# Patient Record
Sex: MALE | Race: WHITE | NOT HISPANIC OR LATINO | URBAN - METROPOLITAN AREA
[De-identification: names, ages, dates, MRNs, and addresses within clinical notes are randomized per-mention and may not be internally consistent; named-entity substitution may affect disease eponyms.]

---

## 2017-02-02 ENCOUNTER — EMERGENCY (EMERGENCY)
Facility: HOSPITAL | Age: 62
LOS: 1 days | Discharge: HOME | End: 2017-02-02
Attending: EMERGENCY MEDICINE | Admitting: EMERGENCY MEDICINE
Payer: COMMERCIAL

## 2017-02-02 VITALS
SYSTOLIC BLOOD PRESSURE: 170 MMHG | HEART RATE: 78 BPM | DIASTOLIC BLOOD PRESSURE: 92 MMHG | TEMPERATURE: 97 F | RESPIRATION RATE: 18 BRPM

## 2017-02-02 DIAGNOSIS — R21 RASH AND OTHER NONSPECIFIC SKIN ERUPTION: ICD-10-CM

## 2017-02-02 PROCEDURE — 99283 EMERGENCY DEPT VISIT LOW MDM: CPT

## 2017-02-02 RX ORDER — CEPHALEXIN 500 MG
500 CAPSULE ORAL ONCE
Qty: 0 | Refills: 0 | Status: COMPLETED | OUTPATIENT
Start: 2017-02-02 | End: 2017-02-02

## 2017-02-02 RX ORDER — CEPHALEXIN 500 MG
1 CAPSULE ORAL
Qty: 40 | Refills: 0 | OUTPATIENT
Start: 2017-02-02 | End: 2017-02-12

## 2017-02-02 RX ADMIN — Medication 500 MILLIGRAM(S): at 20:43

## 2017-02-02 NOTE — ED PROVIDER NOTE - PROGRESS NOTE DETAILS
The scribe's documentation has been prepared under my direction and personally reviewed by me in its entirety. I confirm that the note above accurately reflects all work, treatment, procedures, and medical decision making performed by me.  -GEOVANNI Matute

## 2017-02-02 NOTE — ED PROVIDER NOTE - NS ED MD SCRIBE ATTENDING SCRIBE SECTIONS
VITAL SIGNS( Pullset)/REVIEW OF SYSTEMS/PHYSICAL EXAM/PAST MEDICAL/SURGICAL/SOCIAL HISTORY/HISTORY OF PRESENT ILLNESS/HIV

## 2017-02-02 NOTE — ED PROVIDER NOTE - MEDICAL DECISION MAKING DETAILS
probable folliculitis, advised to use hibi clens otc, will give rx keflex as he states he had similar previously that developed into cellulitis, advised close follow up with PMD, strict return precautions reviewed.

## 2017-02-02 NOTE — ED PROVIDER NOTE - SKIN, MLM
Macules throughout pubic area extending to buttocks following outline of swimsuit. Macules throughout pubic area extending to buttocks and bilateral groin following outline of swimsuit pattern

## 2017-02-02 NOTE — ED ADULT TRIAGE NOTE - CHIEF COMPLAINT QUOTE
Pt c/o rash at groin since yesterday, swollen groin lymph nodes. Hx shingles and lyme disease, denies herpes, unable to assess rash in triage. Started Truvada for Prep <30 days ago.

## 2017-02-02 NOTE — ED PROVIDER NOTE - PHYSICAL EXAMINATION
no penile discharge, no rash on testicles or penis. no penile discharge, no rash on testicles or penis.  no rash involvement to palms or soles

## 2017-02-02 NOTE — ED PROVIDER NOTE - OBJECTIVE STATEMENT
62 y/o M with pmhx herniated disk, on PReP, c/o rash on R groin, noted today on L side of groin and pubic area. Denies pain, itching, f/c. Also noticed inflamed lymph nodes in area. States he just returned from [place] where he used the gym, pool, and sauna. 60 y/o M with pmhx herniated disk, on prep, c/o rash on R groin that started yesterday, noted today on L side of groin and pubic area. Denies pain, itching, f/c. Also noticed inflamed lymph nodes in the groin area. States he just returned from [place] where he used the gym, pool, and sauna. States he had a similar rash previously that became cellulitis due to staph infection. Has recent STD testing 1 month ago with PMD that was negative. 62 y/o M with pmhx herniated disk, on prep, c/o rash on R groin that started yesterday, noted today spread to L side of groin and pubic area and buttocks - follows pattern of his swimsuit. Denies pain, itching, f/c. Also noticed inflamed lymph nodes in the groin area. States he just returned from [place] where he used the gym, pool, and sauna. States he had a similar rash previously that presented similarly that developed into cellulitis due to staph infection. Has recent STD testing 1 month ago with PMD that was negative.

## 2021-06-16 NOTE — ED ADULT TRIAGE NOTE - MEANS OF ARRIVAL
Clinic Care Coordination Contact    Situation: Patient chart reviewed by care coordinator.    Background: SW completed chart review    Assessment: Referral entered for CCC. SW reviewed chart and patient is in long term care, patient not a candidate for CCC at this time.     Plan/Recommendations: If patient discharges from LTC, CCC can be considered.           ambulatory